# Patient Record
Sex: FEMALE | Race: WHITE | Employment: UNEMPLOYED | URBAN - METROPOLITAN AREA
[De-identification: names, ages, dates, MRNs, and addresses within clinical notes are randomized per-mention and may not be internally consistent; named-entity substitution may affect disease eponyms.]

---

## 2018-05-07 ENCOUNTER — OFFICE VISIT (OUTPATIENT)
Dept: FAMILY MEDICINE CLINIC | Facility: CLINIC | Age: 58
End: 2018-05-07

## 2018-05-07 ENCOUNTER — APPOINTMENT (OUTPATIENT)
Dept: LAB | Facility: HOSPITAL | Age: 58
End: 2018-05-07
Attending: FAMILY MEDICINE
Payer: COMMERCIAL

## 2018-05-07 VITALS
WEIGHT: 219 LBS | RESPIRATION RATE: 16 BRPM | HEART RATE: 96 BPM | SYSTOLIC BLOOD PRESSURE: 158 MMHG | DIASTOLIC BLOOD PRESSURE: 100 MMHG | BODY MASS INDEX: 36.05 KG/M2 | HEIGHT: 65.5 IN

## 2018-05-07 DIAGNOSIS — R19.8 CHANGE IN BOWEL MOVEMENT: ICD-10-CM

## 2018-05-07 DIAGNOSIS — K62.89 RECTAL PAIN: ICD-10-CM

## 2018-05-07 DIAGNOSIS — R10.2 PELVIC PAIN: ICD-10-CM

## 2018-05-07 DIAGNOSIS — M76.11 PSOAS TENDINITIS OF RIGHT SIDE: ICD-10-CM

## 2018-05-07 DIAGNOSIS — R10.2 PELVIC PAIN: Primary | ICD-10-CM

## 2018-05-07 DIAGNOSIS — E66.9 OBESITY (BMI 30-39.9): ICD-10-CM

## 2018-05-07 DIAGNOSIS — M54.50 ACUTE RIGHT-SIDED LOW BACK PAIN WITHOUT SCIATICA: ICD-10-CM

## 2018-05-07 PROBLEM — N95.2 VAGINAL ATROPHY: Status: ACTIVE | Noted: 2018-04-23

## 2018-05-07 PROBLEM — N81.9 PELVIC PROLAPSE: Status: RESOLVED | Noted: 2018-05-07 | Resolved: 2018-05-07

## 2018-05-07 PROCEDURE — 84207 ASSAY OF VITAMIN B-6: CPT

## 2018-05-07 PROCEDURE — 84402 ASSAY OF FREE TESTOSTERONE: CPT

## 2018-05-07 PROCEDURE — 84144 ASSAY OF PROGESTERONE: CPT

## 2018-05-07 PROCEDURE — 82306 VITAMIN D 25 HYDROXY: CPT

## 2018-05-07 PROCEDURE — 84443 ASSAY THYROID STIM HORMONE: CPT

## 2018-05-07 PROCEDURE — 99204 OFFICE O/P NEW MOD 45 MIN: CPT | Performed by: FAMILY MEDICINE

## 2018-05-07 PROCEDURE — 82627 DEHYDROEPIANDROSTERONE: CPT | Performed by: FAMILY MEDICINE

## 2018-05-07 PROCEDURE — 86628 CANDIDA ANTIBODY: CPT

## 2018-05-07 PROCEDURE — 86376 MICROSOMAL ANTIBODY EACH: CPT

## 2018-05-07 PROCEDURE — 84482 T3 REVERSE: CPT

## 2018-05-07 PROCEDURE — 84403 ASSAY OF TOTAL TESTOSTERONE: CPT

## 2018-05-07 PROCEDURE — 36415 COLL VENOUS BLD VENIPUNCTURE: CPT

## 2018-05-07 PROCEDURE — 82671 ASSAY OF ESTROGENS: CPT

## 2018-05-07 PROCEDURE — 84481 FREE ASSAY (FT-3): CPT

## 2018-05-07 PROCEDURE — 83735 ASSAY OF MAGNESIUM: CPT

## 2018-05-07 PROCEDURE — 86800 THYROGLOBULIN ANTIBODY: CPT

## 2018-05-07 PROCEDURE — 82607 VITAMIN B-12: CPT

## 2018-05-07 PROCEDURE — 84439 ASSAY OF FREE THYROXINE: CPT

## 2018-05-07 RX ORDER — IBUPROFEN 600 MG/1
TABLET ORAL
COMMUNITY
Start: 2017-11-07

## 2018-05-07 RX ORDER — DOCUSATE SODIUM 100 MG/1
CAPSULE, LIQUID FILLED ORAL
COMMUNITY
Start: 2017-11-07 | End: 2018-06-04

## 2018-05-07 NOTE — PATIENT INSTRUCTIONS
The products and items listed below (the “Products”)  and their claims have not been evaluated by the Food and Drug Administration. Dietary products are not intended to treat, prevent, mitigate or cure disease.  Ultimately, you must draw your own conclusion Description: NOW® Caprylic Acid is a naturally derived nutrient also known as octanoic acid. Caprylic Acid is a medium chain fatty acid (MCT) that is naturally found in coconut and palm kernel oil.   Caprylic Acid may contribute to support a healthy digest Vegetables HIGH SUGAR FRUITS  Bananas  Dates  Fruit juices  Grapes  [place]  Raisins  GLUTINOUS GRAINS  Barley  [place]  Spelt  Wheat  TOXIC MEATS & FISH  Pork  Processed meats  Shellfish  Swordfish  Tuna  SOME DAIRY PRODUCTS  Cheese  Milk  Cream  Whey isolate

## 2018-05-07 NOTE — PROGRESS NOTES
Blaze Valencia is a 62year old female.   Patient presents with:  Establish Care  Other: Hormones-started using estrogen cream and now experiencing burning sensation, nausea, abodminal pain,dizziness,back pain  Thyroid Problem: family history      HPI:   Irma Toussaint Omega-3 Fatty Acids (FISH OIL OR) 2 CAPSULES DAILY WITH A MEAL Disp:  Rfl:    docusate sodium 100 MG Oral Cap Take by mouth.  Disp:  Rfl:        SOCIAL HISTORY:     Social History  Social History   Marital status:   Spouse name: N/A    Years of educa - TESTOSTERONE,FREE AND TOTAL; Future  - VITAMIN D, 25-HYDROXY; Future  - VITAMIN B6; Future  - VITAMIN B12; Future  - PROGESTERONE; Future    2. Psoas tendinitis of right side  - ACUPUNCTURE THERAPY - INTERNAL REFERRAL  - PHYSICAL THERAPY EXTERNAL    3.  O Thyroid Peroxidase (TPO) AB [E]      Thyroid Antithyroglobulin AB [E]      Free T3 (Triiodothryronine)      Reverse T3, Serum      Magnesium [E]      Dehydroepiandrosterone Sulfate [E]      Estrogens Fractionated, S [E]      Testosterone,Free And Total Start taking 1/2 tsp daily and increase dose every 5-7 days until taking 1 tsp 3 times daily. Reduce to lowest dose tolerated if any reactions occur. Buy it online at  [email or web address]"[email or web address]/ or at the [address].           Caprylic Acid   Description: GRAINS & PSEUDO-GRAINS  Amaranth  Black rice  Brown rice  Wild rice  RED MEATS  Beef  Lamb  Venison  NUTS  Nut milks  Nut butters  Walnuts  Pecans  FERMENTED FOODS  Kombucha  [place]  DRINKS  Decaf coffee  Green tea  Vegetable juice  Vegetables HIGH SUGAR FR Return in about 3 weeks (around 5/28/2018) for Integrative Medicine - Established (30 min). Patient affirmed understanding of plan and all questions were answered.      Lacho Hernandez, DO

## 2018-05-25 ENCOUNTER — OFFICE VISIT (OUTPATIENT)
Dept: INTEGRATIVE MEDICINE | Facility: HOSPITAL | Age: 58
End: 2018-05-25
Attending: FAMILY MEDICINE

## 2018-05-25 DIAGNOSIS — K62.89 RECTAL PAIN: Primary | ICD-10-CM

## 2018-06-04 ENCOUNTER — OFFICE VISIT (OUTPATIENT)
Dept: FAMILY MEDICINE CLINIC | Facility: CLINIC | Age: 58
End: 2018-06-04

## 2018-06-04 VITALS
HEIGHT: 66 IN | OXYGEN SATURATION: 95 % | WEIGHT: 213 LBS | BODY MASS INDEX: 34.23 KG/M2 | RESPIRATION RATE: 19 BRPM | HEART RATE: 94 BPM | DIASTOLIC BLOOD PRESSURE: 88 MMHG | SYSTOLIC BLOOD PRESSURE: 130 MMHG

## 2018-06-04 DIAGNOSIS — R10.2 PELVIC PAIN: Primary | ICD-10-CM

## 2018-06-04 DIAGNOSIS — K62.89 RECTAL PAIN: ICD-10-CM

## 2018-06-04 DIAGNOSIS — B37.9 CANDIDIASIS: ICD-10-CM

## 2018-06-04 DIAGNOSIS — R19.8 CHANGE IN BOWEL MOVEMENT: ICD-10-CM

## 2018-06-04 DIAGNOSIS — E55.9 VITAMIN D DEFICIENCY: ICD-10-CM

## 2018-06-04 DIAGNOSIS — M54.50 ACUTE RIGHT-SIDED LOW BACK PAIN WITHOUT SCIATICA: ICD-10-CM

## 2018-06-04 DIAGNOSIS — E66.9 OBESITY (BMI 30-39.9): ICD-10-CM

## 2018-06-04 PROCEDURE — 99214 OFFICE O/P EST MOD 30 MIN: CPT | Performed by: FAMILY MEDICINE

## 2018-06-04 NOTE — PATIENT INSTRUCTIONS
The products and items listed below (the “Products”)  and their claims have not been evaluated by the Food and Drug Administration. Dietary products are not intended to treat, prevent, mitigate or cure disease.  Ultimately, you must draw your own conclusion · Supports Cardiovascular Health by Affecting Arterial Calcium Deposits*  · Supports Healthy Blood Clotting*       Directions:  Take one capsule daily, preferably at mealtime     B Activ    Description: B Activ® contains the entire spectrum of B vitamins to 5. The ultimate goal is to use this diet framework to create a long-term lifestyle for you to follow that will support and maintain optimal health.  It is important that you feel good, enjoy life and food and don't feel like you are being imprisoned by any

## 2018-06-04 NOTE — PROGRESS NOTES
Anisha Farris is a 62year old female. Patient presents with:  Test Results  Integrative Medicine Consult      HPI:     Has started the Restore, getting more formed stools.   Following diet, having more carbs  Has lost 6lbs since starting  Vision is better tailbone removed   • Other      Prolapse surg removal of uterus-2017       PHYSICAL EXAM:      06/04/18  1035   BP: 130/88   Pulse: 94   Resp: 19   SpO2: 95%   Weight: 213 lb   Height: 66\"       Physical Exam   Constitutional: She is oriented to person, The products and items listed below (the “Products”)  and their claims have not been evaluated by the Food and Drug Administration. Dietary products are not intended to treat, prevent, mitigate or cure disease.  Ultimately, you must draw your own conclusion · Supports Cardiovascular Health by Affecting Arterial Calcium Deposits*  · Supports Healthy Blood Clotting*       Directions:  Take one capsule daily, preferably at mealtime     B Activ    Description: B Activ® contains the entire spectrum of B vitamins to 5. The ultimate goal is to use this diet framework to create a long-term lifestyle for you to follow that will support and maintain optimal health.  It is important that you feel good, enjoy life and food and don't feel like you are being imprisoned by any

## 2018-06-14 ENCOUNTER — OFFICE VISIT (OUTPATIENT)
Dept: INTEGRATIVE MEDICINE | Facility: HOSPITAL | Age: 58
End: 2018-06-14
Attending: FAMILY MEDICINE

## 2018-06-14 DIAGNOSIS — N81.85 CERVICAL STUMP PROLAPSE: Primary | ICD-10-CM

## 2018-06-14 NOTE — PROGRESS NOTES
Ban Sepulveda  Integrative Medicine      Patient has felt 30% better and more calm.   Initial Intake:   Patient reports rectal pain with level 8-9 due to a pelvic prolapse and after using estrogen cream that created muscle cramps, diarrhea,amd tight

## 2018-06-19 ENCOUNTER — OFFICE VISIT (OUTPATIENT)
Dept: INTEGRATIVE MEDICINE | Facility: HOSPITAL | Age: 58
End: 2018-06-19
Attending: FAMILY MEDICINE

## 2018-06-19 DIAGNOSIS — K62.89 RECTAL PAIN: Primary | ICD-10-CM

## 2018-06-19 NOTE — PROGRESS NOTES
Bozena Philip  Integrative Medicine        Initial Intake:   Patient reports rectal pain with level 8-9 due to a pelvic prolapse and after using estrogen cream that created muscle cramps, diarrhea,amd tight muscles. She suffers of a lot of anxiety.

## 2018-06-21 ENCOUNTER — OFFICE VISIT (OUTPATIENT)
Dept: INTEGRATIVE MEDICINE | Facility: HOSPITAL | Age: 58
End: 2018-06-21
Attending: FAMILY MEDICINE

## 2018-06-21 DIAGNOSIS — K62.89 RECTAL PAIN: Primary | ICD-10-CM

## 2018-06-22 NOTE — PROGRESS NOTES
[facility]  Integrative Medicine      Patient has felt 28% better and more calm.   Initial Intake:   Patient reports rectal pain with level 8-9 due to a pelvic prolapse and after using estrogen cream that created muscle cramps, diarrhea,amd tight

## 2018-06-22 NOTE — PROGRESS NOTES
Bashir Zayas  Integrative Medicine      Patient has felt 30% better and more calm.   Initial Intake:   Patient reports rectal pain with level 8-9 due to a pelvic prolapse and after using estrogen cream that created muscle cramps, diarrhea,amd tight

## 2018-06-26 ENCOUNTER — OFFICE VISIT (OUTPATIENT)
Dept: INTEGRATIVE MEDICINE | Facility: HOSPITAL | Age: 58
End: 2018-06-26
Attending: FAMILY MEDICINE

## 2018-06-26 DIAGNOSIS — K62.89 RECTAL PAIN: Primary | ICD-10-CM

## 2018-06-27 NOTE — PROGRESS NOTES
Anthony Jauregui  Integrative Medicine      Patient has felt 35% better and more calm.   Initial Intake:   Patient reports rectal pain with level 8-9 due to a pelvic prolapse and after using estrogen cream that created muscle cramps, diarrhea,amd tight

## 2018-06-28 ENCOUNTER — APPOINTMENT (OUTPATIENT)
Dept: INTEGRATIVE MEDICINE | Facility: HOSPITAL | Age: 58
End: 2018-06-28
Attending: FAMILY MEDICINE

## 2018-07-03 ENCOUNTER — APPOINTMENT (OUTPATIENT)
Dept: INTEGRATIVE MEDICINE | Facility: HOSPITAL | Age: 58
End: 2018-07-03
Attending: FAMILY MEDICINE

## 2018-07-05 ENCOUNTER — APPOINTMENT (OUTPATIENT)
Dept: INTEGRATIVE MEDICINE | Facility: HOSPITAL | Age: 58
End: 2018-07-05
Attending: FAMILY MEDICINE

## 2018-07-10 ENCOUNTER — OFFICE VISIT (OUTPATIENT)
Dept: INTEGRATIVE MEDICINE | Facility: HOSPITAL | Age: 58
End: 2018-07-10
Attending: FAMILY MEDICINE

## 2018-07-10 DIAGNOSIS — K62.89 RECTAL PAIN: Primary | ICD-10-CM

## 2018-07-11 NOTE — PROGRESS NOTES
Martell High  Integrative Medicine      Patient has felt 45% better and more calm.   Initial Intake:   Patient reports rectal pain with level 8-9 due to a pelvic prolapse and after using estrogen cream that created muscle cramps, diarrhea,amd tight

## 2018-07-12 ENCOUNTER — APPOINTMENT (OUTPATIENT)
Dept: INTEGRATIVE MEDICINE | Facility: HOSPITAL | Age: 58
End: 2018-07-12
Attending: FAMILY MEDICINE

## 2018-07-17 ENCOUNTER — APPOINTMENT (OUTPATIENT)
Dept: INTEGRATIVE MEDICINE | Facility: HOSPITAL | Age: 58
End: 2018-07-17
Attending: FAMILY MEDICINE

## 2018-07-19 ENCOUNTER — APPOINTMENT (OUTPATIENT)
Dept: INTEGRATIVE MEDICINE | Facility: HOSPITAL | Age: 58
End: 2018-07-19
Attending: FAMILY MEDICINE

## 2018-07-24 ENCOUNTER — APPOINTMENT (OUTPATIENT)
Dept: INTEGRATIVE MEDICINE | Facility: HOSPITAL | Age: 58
End: 2018-07-24
Attending: FAMILY MEDICINE

## 2018-07-26 ENCOUNTER — APPOINTMENT (OUTPATIENT)
Dept: INTEGRATIVE MEDICINE | Facility: HOSPITAL | Age: 58
End: 2018-07-26
Attending: FAMILY MEDICINE

## 2018-07-31 ENCOUNTER — APPOINTMENT (OUTPATIENT)
Dept: INTEGRATIVE MEDICINE | Facility: HOSPITAL | Age: 58
End: 2018-07-31
Attending: FAMILY MEDICINE

## 2018-08-03 ENCOUNTER — OFFICE VISIT (OUTPATIENT)
Dept: INTEGRATIVE MEDICINE | Facility: HOSPITAL | Age: 58
End: 2018-08-03
Attending: GENERAL ACUTE CARE HOSPITAL

## 2018-08-03 DIAGNOSIS — K62.89 RECTAL PAIN: Primary | ICD-10-CM

## 2018-08-03 NOTE — PROGRESS NOTES
Ban Sepulveda  Integrative Medicine      Patient has felt 50% better and more calm.   Initial Intake:   Patient reports rectal pain with level 8-9 due to a pelvic prolapse and after using estrogen cream that created muscle cramps, diarrhea,amd tight

## 2018-08-07 ENCOUNTER — APPOINTMENT (OUTPATIENT)
Dept: INTEGRATIVE MEDICINE | Facility: HOSPITAL | Age: 58
End: 2018-08-07
Attending: GENERAL ACUTE CARE HOSPITAL

## 2018-08-10 ENCOUNTER — APPOINTMENT (OUTPATIENT)
Dept: INTEGRATIVE MEDICINE | Facility: HOSPITAL | Age: 58
End: 2018-08-10
Attending: GENERAL ACUTE CARE HOSPITAL

## 2018-08-16 ENCOUNTER — APPOINTMENT (OUTPATIENT)
Dept: INTEGRATIVE MEDICINE | Facility: HOSPITAL | Age: 58
End: 2018-08-16
Attending: GENERAL ACUTE CARE HOSPITAL

## 2018-08-17 ENCOUNTER — APPOINTMENT (OUTPATIENT)
Dept: INTEGRATIVE MEDICINE | Facility: HOSPITAL | Age: 58
End: 2018-08-17
Attending: GENERAL ACUTE CARE HOSPITAL

## 2018-08-21 ENCOUNTER — APPOINTMENT (OUTPATIENT)
Dept: INTEGRATIVE MEDICINE | Facility: HOSPITAL | Age: 58
End: 2018-08-21
Attending: GENERAL ACUTE CARE HOSPITAL